# Patient Record
Sex: FEMALE | Race: WHITE | Employment: OTHER | URBAN - METROPOLITAN AREA
[De-identification: names, ages, dates, MRNs, and addresses within clinical notes are randomized per-mention and may not be internally consistent; named-entity substitution may affect disease eponyms.]

---

## 2019-04-03 ENCOUNTER — APPOINTMENT (OUTPATIENT)
Dept: CT IMAGING | Age: 74
End: 2019-04-03
Payer: MEDICARE

## 2019-04-03 ENCOUNTER — APPOINTMENT (OUTPATIENT)
Dept: GENERAL RADIOLOGY | Age: 74
End: 2019-04-03
Payer: MEDICARE

## 2019-04-03 ENCOUNTER — HOSPITAL ENCOUNTER (EMERGENCY)
Age: 74
Discharge: HOME | End: 2019-04-03
Payer: MEDICARE

## 2019-04-03 VITALS
TEMPERATURE: 98.6 F | DIASTOLIC BLOOD PRESSURE: 59 MMHG | BODY MASS INDEX: 44.16 KG/M2 | SYSTOLIC BLOOD PRESSURE: 135 MMHG | HEART RATE: 78 BPM | RESPIRATION RATE: 16 BRPM | HEIGHT: 62 IN | OXYGEN SATURATION: 95 % | WEIGHT: 240 LBS

## 2019-04-03 DIAGNOSIS — S01.81XA FACIAL LACERATION, INITIAL ENCOUNTER: ICD-10-CM

## 2019-04-03 DIAGNOSIS — S89.92XA INJURY OF LEFT KNEE, INITIAL ENCOUNTER: ICD-10-CM

## 2019-04-03 DIAGNOSIS — S16.1XXA STRAIN OF NECK MUSCLE, INITIAL ENCOUNTER: ICD-10-CM

## 2019-04-03 DIAGNOSIS — S09.90XA INJURY OF HEAD, INITIAL ENCOUNTER: Primary | ICD-10-CM

## 2019-04-03 PROCEDURE — 73560 X-RAY EXAM OF KNEE 1 OR 2: CPT

## 2019-04-03 PROCEDURE — 99284 EMERGENCY DEPT VISIT MOD MDM: CPT

## 2019-04-03 PROCEDURE — 2500000003 HC RX 250 WO HCPCS: Performed by: PHYSICIAN ASSISTANT

## 2019-04-03 PROCEDURE — 70450 CT HEAD/BRAIN W/O DYE: CPT

## 2019-04-03 PROCEDURE — 72125 CT NECK SPINE W/O DYE: CPT

## 2019-04-03 PROCEDURE — 4500000027

## 2019-04-03 PROCEDURE — 6370000000 HC RX 637 (ALT 250 FOR IP): Performed by: PHYSICIAN ASSISTANT

## 2019-04-03 RX ORDER — HYDROCODONE BITARTRATE AND ACETAMINOPHEN 5; 325 MG/1; MG/1
1 TABLET ORAL ONCE
Status: COMPLETED | OUTPATIENT
Start: 2019-04-03 | End: 2019-04-03

## 2019-04-03 RX ORDER — METHOCARBAMOL 500 MG/1
500 TABLET, FILM COATED ORAL 4 TIMES DAILY
Qty: 20 TABLET | Refills: 0 | Status: SHIPPED | OUTPATIENT
Start: 2019-04-03 | End: 2019-04-15

## 2019-04-03 RX ORDER — IBUPROFEN 600 MG/1
600 TABLET ORAL EVERY 6 HOURS PRN
Qty: 30 TABLET | Refills: 0 | Status: SHIPPED | OUTPATIENT
Start: 2019-04-03

## 2019-04-03 RX ORDER — LIDOCAINE HYDROCHLORIDE 20 MG/ML
5 INJECTION, SOLUTION INFILTRATION; PERINEURAL ONCE
Status: COMPLETED | OUTPATIENT
Start: 2019-04-03 | End: 2019-04-03

## 2019-04-03 RX ADMIN — HYDROCODONE BITARTRATE AND ACETAMINOPHEN 1 TABLET: 5; 325 TABLET ORAL at 14:44

## 2019-04-03 RX ADMIN — LIDOCAINE HYDROCHLORIDE 5 ML: 20 INJECTION, SOLUTION INFILTRATION; PERINEURAL at 14:43

## 2019-04-03 ASSESSMENT — PAIN SCALES - GENERAL
PAINLEVEL_OUTOF10: 7
PAINLEVEL_OUTOF10: 7
PAINLEVEL_OUTOF10: 5
PAINLEVEL_OUTOF10: 8

## 2019-04-03 ASSESSMENT — PAIN DESCRIPTION - INTENSITY: RATING_2: 8

## 2019-04-03 ASSESSMENT — PAIN DESCRIPTION - LOCATION
LOCATION: HEAD
LOCATION: HEAD;KNEE
LOCATION_2: KNEE

## 2019-04-03 ASSESSMENT — PAIN DESCRIPTION - ORIENTATION: ORIENTATION_2: LEFT

## 2019-04-03 ASSESSMENT — PAIN DESCRIPTION - PAIN TYPE
TYPE: ACUTE PAIN
TYPE: ACUTE PAIN

## 2019-04-03 ASSESSMENT — PAIN DESCRIPTION - DESCRIPTORS: DESCRIPTORS: ACHING

## 2019-04-03 NOTE — ED NOTES
Bed: H-01  Expected date: 4/3/19  Expected time:   Means of arrival: Ambulance  Comments:  Renetta Gill  04/03/19 [id number]

## 2019-04-03 NOTE — ED PROVIDER NOTES
eMERGENCY dEPARTMENT eNCOUnter        [address] [facility]    Chief Complaint   Patient presents with    Fall     pt states she tripped over a door stopper and fell into a metal picinic table; laceration to forehead and states she also landed on her left knee    Headache     reports headache after fall; denies visual changes; denies dizziness; denies LOC when she hit her head; pt denies being on blood thinners     Head Injury    Knee Pain     left knee pain secondary to fall       HPI     Tian Hsieh is a 68 y.o. female who presents with a head injury with an onset  approximately 1 hour prior to arrival.   The context (mechanism) was patient was walking outside a school building when she tripped over a door stopper, fell forward and hit her head on a metal picnic table. There was no associated loss of consciousness. The patient has associated neck pain. She has associated laceration to left side of forehead as well as left knee pain. REVIEW OF SYSTEMS    Constitutional:  Denies fever, chills  Neurologic:  See HPI. Denies LOC. Denies confusion or memory loss. Denies light-headedness, dizziness. See hpi  Eyes:  Denies dipplopia, blurred vision, or loss visual field. Denies discharge. Ears: no ear trauma or hearing changes  Musculoskeletal:  See HPI. No upper or lower extremity injuries. Cardiac: No Chest Pain, palpitations, or Chest Injury  Respiratory:  Denies cough, shortness of breath, respiratory discomfort   GI: No nausea or vomiting.   No Abdominal pain or Abdominal Injury  : No Dysuria or Hematuria   Skin:  + laceration      All other review of systems are negative  See HPI and nursing notes for additional information         PAST MEDICAL & SURGICAL HISTORY    Past Medical History:   Diagnosis Date    Acid reflux     Arthritis     Hemorrhoids     HX OTHER MEDICAL     Primary Care Physician Is Dr. Rakesh Fleming Hyperlipidemia 2004    Wears glasses      Past Surgical History: Procedure Laterality Date    BREAST BIOPSY Left 2000's    \"Calcium Deposits\", Benign    BREAST LUMPECTOMY Right 1970    Benign    CHOLECYSTECTOMY  2/14/14    laprascopic    COLONOSCOPY  2004, 7-29-14    KNEE ARTHROSCOPY Left 2012    KNEE SURGERY      TONSILLECTOMY  1950's    TUBAL LIGATION  1978       CURRENT MEDICATIONS    Current Outpatient Rx   Medication Sig Dispense Refill    ibuprofen (ADVIL;MOTRIN) 600 MG tablet Take 1 tablet by mouth every 6 hours as needed for Pain 30 tablet 0    methocarbamol (ROBAXIN) 500 MG tablet Take 1 tablet by mouth 4 times daily As needed for muscle spasm. 20 tablet 0    ondansetron (ZOFRAN ODT) 4 MG disintegrating tablet Take 2 tablets by mouth every 8 hours as needed for Nausea or Vomiting 12 tablet 0    loperamide (RA ANTI-DIARRHEAL) 2 MG capsule Take 1 capsule by mouth 4 times daily as needed for Diarrhea 20 capsule 0    simvastatin (ZOCOR) 10 MG tablet Take 10 mg by mouth nightly      esomeprazole Magnesium (NEXIUM) 20 MG PACK Take 20 mg by mouth daily      simvastatin (ZOCOR) 20 MG tablet Take 20 mg by mouth nightly.  Cholecalciferol (VITAMIN D) 2000 UNITS CAPS capsule Take  by mouth daily. Over The Counter      vitamin B-12 (CYANOCOBALAMIN) 1000 MCG tablet Place 1,000 mcg under the tongue daily. Over The Counter      simvastatin (ZOCOR) 20 MG tablet Take 1 tablet by mouth nightly.  90 tablet 1       ALLERGIES    No Known Allergies    SOCIAL & FAMILY HISTORY    Social History     Socioeconomic History    Marital status:      Spouse name: None    Number of children: None    Years of education: None    Highest education level: None   Occupational History    None   Social Needs    Financial resource strain: None    Food insecurity:     Worry: None     Inability: None    Transportation needs:     Medical: None     Non-medical: None   Tobacco Use    Smoking status: Never Smoker    Smokeless tobacco: Never Used   Substance and Sexual Activity  Alcohol use: No    Drug use: No    Sexual activity: Never   Lifestyle    Physical activity:     Days per week: None     Minutes per session: None    Stress: None   Relationships    Social connections:     Talks on phone: None     Gets together: None     Attends Yarsanism service: None     Active member of club or organization: None     Attends meetings of clubs or organizations: None     Relationship status: None    Intimate partner violence:     Fear of current or ex partner: None     Emotionally abused: None     Physically abused: None     Forced sexual activity: None   Other Topics Concern    None   Social History Narrative    None     Family History   Adopted: Yes       PHYSICAL EXAM    VITAL SIGNS: /67   Pulse 68   Temp 98.6 °F (37 °C)   Resp 16   Ht 5' 1.5\" (1.562 m)   Wt 240 lb (108.9 kg)   SpO2 99%   BMI 44.61 kg/m²    Constitutional:  Well developed, well nourished, no acute distress   Scalp:  No swelling, discoloration. Skin intact  Neck:   No JVD    No swelling or discoloration on inspection. No posterior midline neck tenderness. No pain or deficit on range of motion. Eyes: PERRL. EOMI. No nystagmus. Funduscopic exam reveals no obvious retinal hemorrhages, defects. HENT:   Laceration noted to left side of forehead  No trismus, airway patent. EACs and TMs clear. Nares patent bilaterally without clear fluid or blood. No septal mass or evidence of hematoma. Oropharynx clear, dentition intact   No Castillo sign,  no raccoon sign. Palpation of facial bones reveal no focal tenderness, palpable defect. Respiratory:  Lungs Clear, no retractions. No chest wall swelling, discoloration, tenderness  Cardiovascular:  Reg rate, no murmurs  GI:  Soft, nontender, normal bowel sounds  Musculoskeletal:  No edema, no acute deformities  Left knee without swelling, bruising or ecchymosis. There is discomfort palpation along the anterior patella and into lateral joint line.  Patient has decreased flexion. Full extension, able to raise heel off the bed. Sensation intact throughout left lower extremity. Posterior tibial pulse 2+, dorsalis pedis pulse 2+. No skin changes, tenderness or range of motion deficit of hip or ankle. Integument:  Well hydrated, no petechiae   Laceration noted to left side of forehead, approximately 2.2 cm in length, oozing blood. Wound edges are jagged. Slightly gaping. No evidence of foreign body or tendon involvement. Patient able to raise eyebrows. Neurologic:    - Alert & oriented person, place, time, and situation, no speech difficulties or slurring.  - No obvious gross motor deficits  - Cranial nerves 2-12 grossly intact  - Patient reports small area of altered sensation just lateral to left eye, otherwise sensation intact to light touch  - Strength 5/5 in upper and lower extremities bilaterally  - Normal finger to nose test bilaterally  - Rapid alternating movements intact  - Normal heel-shin bilaterally  - No pronator drift. - Light touch sensation intact throughout. - Upper and lower extremity DTRs 2+ bilaterally. - Gait steady and without difficulty    Psych: Pleasant affect, no hallucinations      IMAGING:  CT Cervical Spine WO Contrast   Final Result   No acute abnormality of the cervical spine. CT Head WO Contrast   Final Result   No acute intracranial abnormality. XR KNEE LEFT (1-2 VIEWS)   Final Result   1. No acute bony or joint abnormality   2. Tricompartmental osteoarthritic changes           ________________________________________________________________________       Procedure Note - Ramonita Allen PA-C      Laceration Repair Procedure Note    Indication: Skin Laceration    Procedure:   - Procedure explained, including risks and benefits explained to the patient who expressed understanding. All questions were answered. Verbal consent obtained.     - The Wound was prepped and draped in the usual sterile fashion using Betadine and sterile saline.  - The wound is anesthetized using 2% lidocaine, approximately 3 ml  - Wound was explored to it's depth,  no compromise of neurovascular structures, no foreign bodies. - Wound was irrigated with copious amounts of sterile saline and mechanically debrided utilizing sterile gauze. - The laceration was Closed with 5-0 Prolene sutures, total number of 6, simple interrupted  - Hemostasis and good cosmesis was achieved. Blood loss minimal.  - The wound area was then dressed with Sterile nonstick dressing, sterile gauze, and tape. - Patient tolerated procedure well without complications. Total repaired wound length: 3 cm    Discussed with Pt (and/or family member) at bedside today:  I discussed possibility of infection, retained foreign body, tendon injury, nerve injury. Wound care and scar minimization education was provided. Instructions were given to return for increasing pain, redness, streaking, discharge, or any other worsening or worrisome concerns. Wound check in 48 hours. Suture/Staple removal in 5 days. ________________________________________________________________________          ED COURSE & MEDICAL DECISION MAKING       Vital signs and nursing notes reviewed during ED course. I have independently evaluated this patient . Supervising MD present in the Emergency Department, available for consultation, throughout entirety of  patient care. Patient presents above following a fall. On arrival, patient is hemodynamically stable, afebrile. She is neurologically intact. Left forehead closed as above. Proper wound care discussed with patient she is to have wound recheck the next several days and have sutures removed in 5 days. Imaging of head, neck and knee without acute abnormality. Knee is Ace wrap in the ED, patient provided with crutches.  She understands that there may be internal derangement of knee and that she is follow-up with orthopedics next week for further evaluation. Patient to return here with any acutely worsening symptoms including onset of any neurological symptoms. The patient and/or the family were informed of the results of any tests/labs/imaging, the treatment plan, and time was allotted to answer questions. Clinical  IMPRESSION    1. Injury of head, initial encounter    2. Facial laceration, initial encounter    3. Injury of left knee, initial encounter    4. Strain of neck muscle, initial encounter          Diagnosis and plan discussed in detail with patient who understands and agrees. Return to emergency Department precautions, which included any change in nature or severity of symptoms, development of numbness/tingling, or weakness, or any new symptoms, were discussed in detail with patient who understands and agrees. Comment: Please note this report has been produced using speech recognition software and may contain errors related to that system including errors in grammar, punctuation, and spelling, as well as words and phrases that may be inappropriate. If there are any questions or concerns please feel free to contact the dictating provider for clarification.               TREE Kenny  04/03/19 1080

## 2019-04-15 ENCOUNTER — OFFICE VISIT (OUTPATIENT)
Dept: ORTHOPEDIC SURGERY | Age: 74
End: 2019-04-15
Payer: MEDICARE

## 2019-04-15 VITALS
BODY MASS INDEX: 45.17 KG/M2 | OXYGEN SATURATION: 99 % | HEART RATE: 75 BPM | RESPIRATION RATE: 14 BRPM | WEIGHT: 243 LBS

## 2019-04-15 DIAGNOSIS — R52 PAIN: ICD-10-CM

## 2019-04-15 DIAGNOSIS — M17.12 ARTHRITIS OF KNEE, LEFT: Primary | ICD-10-CM

## 2019-04-15 PROBLEM — E78.5 HYPERLIPIDEMIA: Status: ACTIVE | Noted: 2017-10-20

## 2019-04-15 PROBLEM — S06.0X0A CONCUSSION WITHOUT LOSS OF CONSCIOUSNESS: Status: ACTIVE | Noted: 2019-04-11

## 2019-04-15 PROCEDURE — 99202 OFFICE O/P NEW SF 15 MIN: CPT | Performed by: PHYSICIAN ASSISTANT

## 2019-04-15 RX ORDER — CALCIUM CARBONATE 200(500)MG
TABLET,CHEWABLE ORAL
COMMUNITY
Start: 2017-04-06

## 2019-04-15 RX ORDER — SIMVASTATIN 20 MG
20 TABLET ORAL
COMMUNITY

## 2019-04-15 RX ORDER — METHOCARBAMOL 500 MG/1
500 TABLET, FILM COATED ORAL
COMMUNITY
Start: 2019-04-03 | End: 2021-06-15

## 2019-04-15 ASSESSMENT — ENCOUNTER SYMPTOMS
GASTROINTESTINAL NEGATIVE: 1
EYES NEGATIVE: 1
RESPIRATORY NEGATIVE: 1

## 2019-04-15 NOTE — PROGRESS NOTES
Patient is a 68year old female that presents in office with c/o left knee pain. Patient stated that she fell about a week and a half ago. Previous left knee meniscus repair performed in 2012  In [place]. No other conservative treatments, injuries, or surgeries reported. Associated sx: swelling and bruising. Pain is located along the medial and anterior aspect of the knee.

## 2019-04-15 NOTE — PROGRESS NOTES
Scribe Authentication Statement  Paul Noland, scribed portions of this documentation for and in the presence of Manuel Flores PA-C on 4/15/19 at 10:46 AM.    Provider Authentication Statement:  I, Sherron Mcbride PA-C, personally performed the services described in this documentation and they were scribed in my presence by the above listed scribe. The documentation is both accurate and complete. Review of Systems   Constitutional: Positive for fatigue. HENT: Negative. Eyes: Negative. Respiratory: Negative. Cardiovascular: Negative. Gastrointestinal: Negative. Genitourinary: Negative. Musculoskeletal: Positive for arthralgias, joint swelling and myalgias. Skin: Negative. Neurological: Negative. Psychiatric/Behavioral: Negative. HPI:  Cordell Bob is a 68y.o. year old female who complains of left knee pain. Patient stated that she fell about a week and a half ago. Her pain initially was about a 7/10, aching in the anterior and medial aspect of the knee but now the pain is improving. Previous left knee meniscectomy performed in 2012 in [place]. No other conservative treatments, injuries, or surgeries reported. Associated sx: swelling and bruising. She has been taking Tylenol with mild improvement of her pain. She was given crutches but she states that she was so sore she cannot use her crutches so she has been bearing weight as tolerated. Referred by ED for left knee pain.      Past Medical History:   Diagnosis Date    Acid reflux     Arthritis     Hemorrhoids     HX OTHER MEDICAL     Primary Care Physician Is Dr. Pryor Door Hyperlipidemia 2004    Wears glasses        Past Surgical History:   Procedure Laterality Date    BREAST BIOPSY Left 2000's    \"Calcium Deposits\", Benign    BREAST LUMPECTOMY Right 1970    Benign    CHOLECYSTECTOMY  2/14/14    laprascopic    COLONOSCOPY  2004, 7-29-14    KNEE ARTHROSCOPY Left 2012    KNEE SURGERY      TONSILLECTOMY  1950's    TUBAL LIGATION  1978       Family History   Adopted: Yes       Social History     Socioeconomic History    Marital status:      Spouse name: None    Number of children: None    Years of education: None    Highest education level: None   Occupational History    None   Social Needs    Financial resource strain: None    Food insecurity:     Worry: None     Inability: None    Transportation needs:     Medical: None     Non-medical: None   Tobacco Use    Smoking status: Never Smoker    Smokeless tobacco: Never Used   Substance and Sexual Activity    Alcohol use: No    Drug use: No    Sexual activity: Never   Lifestyle    Physical activity:     Days per week: None     Minutes per session: None    Stress: None   Relationships    Social connections:     Talks on phone: None     Gets together: None     Attends Moravian service: None     Active member of club or organization: None     Attends meetings of clubs or organizations: None     Relationship status: None    Intimate partner violence:     Fear of current or ex partner: None     Emotionally abused: None     Physically abused: None     Forced sexual activity: None   Other Topics Concern    None   Social History Narrative    None       Current Outpatient Medications   Medication Sig Dispense Refill    simvastatin (ZOCOR) 20 MG tablet Take 20 mg by mouth      Mirabegron ER (MYRBETRIQ) 50 MG TB24 Take 50 mg by mouth      methocarbamol (ROBAXIN) 500 MG tablet Take 500 mg by mouth      calcium carbonate (TUMS) 500 MG chewable tablet Take one tablet daily by mouth CALCIUM/HEARTBURN      ibuprofen (ADVIL;MOTRIN) 600 MG tablet Take 1 tablet by mouth every 6 hours as needed for Pain 30 tablet 0    esomeprazole Magnesium (NEXIUM) 20 MG PACK Take 20 mg by mouth daily      Cholecalciferol (VITAMIN D) 2000 UNITS CAPS capsule Take  by mouth daily.  Over The Counter      vitamin B-12 (CYANOCOBALAMIN) 1000 MCG tablet Place 1,000 mcg under the tongue daily. Over The Counter       No current facility-administered medications for this visit. No Known Allergies    Review of Systems:  See above      Physical Exam:   Pulse 75   Resp 14   Wt 243 lb (110.2 kg)   SpO2 99%   BMI 45.17 kg/m²        Gait is trace abnormal. The patient can bear weight on the injured extremity. Gen/Psych: Examinationreveals a pleasant individual in no acute distress. The patient is oriented to time, place and person. The patient's mood and affect are appropriate.     Lymph: The lymphatic examination bilaterally reveals allareas to be without enlargement or induration.      Skin intact without lymphadenopathy, discoloration, or abnormal temperature.      Vascular: There is intact, symmetric circulation in both lowerextremities. left leg/knee exam:  Leg alignment:     neutral  Quadriceps/hamstring atrophy:   no  Knee effusion:    mild   Knee erythema:   No but there is mild ecchymosis. ROM:     0-115°  Varus laxity at 0 and 30 deg's: no  Valguslaxity at 0 and 30 deg's: no  Recurvatum:    no  Tenderness at:   Medial joint line and anterior knee  Knee strength is 5/5 flexion and extension  There is + L2-S1 motor and sensory function. Outside record review: ER records and historical medical records    Imaging:  left knee x-rays, three views,were obtained and reviewed and show no fractures, no dislocations. Moderate DJD of the left knee mostly in the medial compartment with severe joint space narrowing and osteophyte formation. The official read and interpretation of these x-rays will be done by the the [facility]         Impression:  left knee DJD      Plan:  Natural history and expected course discussed. Questions answered.   Take OTC medication as needed  Activity as tolerated   Call office with persistent or worsening pain

## 2019-04-15 NOTE — PATIENT INSTRUCTIONS
Take OTC medication as needed  Activity as tolerated   Call office with persistent or worsening pain

## 2020-03-25 PROBLEM — K21.9 GERD (GASTROESOPHAGEAL REFLUX DISEASE): Status: RESOLVED | Noted: 2020-03-25 | Resolved: 2020-03-24

## 2020-03-25 PROBLEM — E78.5 HYPERLIPIDEMIA: Status: RESOLVED | Noted: 2020-03-25 | Resolved: 2020-03-24

## 2021-06-20 ENCOUNTER — APPOINTMENT (OUTPATIENT)
Dept: CT IMAGING | Age: 76
End: 2021-06-20
Payer: MEDICARE

## 2021-06-20 ENCOUNTER — HOSPITAL ENCOUNTER (EMERGENCY)
Age: 76
Discharge: HOME | End: 2021-06-20
Payer: MEDICARE

## 2021-06-20 VITALS
DIASTOLIC BLOOD PRESSURE: 46 MMHG | OXYGEN SATURATION: 98 % | SYSTOLIC BLOOD PRESSURE: 134 MMHG | RESPIRATION RATE: 18 BRPM | HEART RATE: 69 BPM | TEMPERATURE: 98.5 F

## 2021-06-20 DIAGNOSIS — S00.83XA CONTUSION OF FACE, INITIAL ENCOUNTER: ICD-10-CM

## 2021-06-20 DIAGNOSIS — S09.90XA CLOSED HEAD INJURY, INITIAL ENCOUNTER: ICD-10-CM
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
## 2021-06-20 PROCEDURE — 70450 CT HEAD/BRAIN W/O DYE: CPT

## 2021-06-20 PROCEDURE — 99285 EMERGENCY DEPT VISIT HI MDM: CPT

## 2021-06-20 PROCEDURE — 70486 CT MAXILLOFACIAL W/O DYE: CPT

## 2021-06-20 PROCEDURE — 90471 IMMUNIZATION ADMIN: CPT | Performed by: PHYSICIAN ASSISTANT

## 2021-06-20 PROCEDURE — 6360000002 HC RX W HCPCS: Performed by: PHYSICIAN ASSISTANT

## 2021-06-20 PROCEDURE — 6370000000 HC RX 637 (ALT 250 FOR IP): Performed by: PHYSICIAN ASSISTANT

## 2021-06-20 PROCEDURE — 72125 CT NECK SPINE W/O DYE: CPT

## 2021-06-20 PROCEDURE — 90715 TDAP VACCINE 7 YRS/> IM: CPT | Performed by: PHYSICIAN ASSISTANT

## 2021-06-20 RX ORDER — ACETAMINOPHEN 500 MG
1000 TABLET ORAL ONCE
Status: COMPLETED | OUTPATIENT
Start: 2021-06-20 | End: 2021-06-20

## 2021-06-20 RX ADMIN — ACETAMINOPHEN 1000 MG: 500 TABLET, FILM COATED ORAL at 16:33

## 2021-06-20 RX ADMIN — TETANUS TOXOID, REDUCED DIPHTHERIA TOXOID AND ACELLULAR PERTUSSIS VACCINE, ADSORBED 0.5 ML: 5; 2.5; 8; 8; 2.5 SUSPENSION INTRAMUSCULAR at 16:34

## 2021-06-20 ASSESSMENT — PAIN SCALES - GENERAL
PAINLEVEL_OUTOF10: 8
PAINLEVEL_OUTOF10: 8

## 2021-06-28 NOTE — ED PROVIDER NOTES
EMERGENCY DEPARTMENT ENCOUNTER      PCP: Red Robertson, [facility]    Chief Complaint   Patient presents with    Fall     hit head when she fell walking down hallway, no LOC, no blood thinners     This patient was not evaluated by the attending physician. I have independently evaluated this patient. BRAD Lama is a 77 y.o. female who presents to the emergency department today via private vehicle after sustaining a mechanical fall. Patient states that she tripped forward causing her to hit her face, has some left-sided facial pain, evidence of a scalp hematoma. She denies any anticoagulated. Denies losing consciousness, did the helped to her feet. Describing no other injuries. Is alert oriented, no signs of neurologic abnormalities. Is conversational.  States that she usually gets around well. Denies any preceding symptoms    REVIEW OF SYSTEMS    General: No Fever  ENT:  No visual changes. Mild headache. Cardiac: No Chest Pain, No syncope  Respiratory: No cough or difficulty breathing  GI: No vomiting. No Bloody Stool or Diarrhea  : No Dysuria or Hematuria  MSKTL: No extremity pain. no neck or back pain.   Skin:  Denies rash  Neurologic:  Denies headache, focal weakness or sensory changes   Endocrine:  Denies polyuria or polydypsia   Lymphatic:  Denies swollen glands   See HPI and nursing notes for additional information     PAST MEDICAL & SURGICAL HISTORY    Past Medical History:   Diagnosis Date    Acid reflux     Arthritis     Hemorrhoids     HX OTHER MEDICAL     Primary Care Physician Is Dr. Emma Deal Hyperlipidemia 2004    Wears glasses      Past Surgical History:   Procedure Laterality Date    BREAST BIOPSY Left 2000's    \"Calcium Deposits\", Benign    BREAST LUMPECTOMY Right 1970    Benign    CHOLECYSTECTOMY  2/14/14    laprascopic    COLONOSCOPY  2004, 7-29-14    KNEE ARTHROSCOPY Left 2012    KNEE SURGERY      TONSILLECTOMY  1950's    TUBAL LIGATION 1978       CURRENT MEDICATIONS    Current Outpatient Rx   Medication Sig Dispense Refill    lidocaine-prilocaine (EMLA) 2.5-2.5 % cream Apply topically as needed. 1 Tube 0    simvastatin (ZOCOR) 20 MG tablet Take 20 mg by mouth      calcium carbonate (TUMS) 500 MG chewable tablet Take one tablet daily by mouth CALCIUM/HEARTBURN      ibuprofen (ADVIL;MOTRIN) 600 MG tablet Take 1 tablet by mouth every 6 hours as needed for Pain 30 tablet 0    esomeprazole Magnesium (NEXIUM) 20 MG PACK Take 20 mg by mouth daily      Cholecalciferol (VITAMIN D) 2000 UNITS CAPS capsule Take  by mouth daily. Over The Counter      vitamin B-12 (CYANOCOBALAMIN) 1000 MCG tablet Place 1,000 mcg under the tongue daily. Over The Counter         ALLERGIES    No Known Allergies    SOCIAL & FAMILY HISTORY    Social History     Socioeconomic History    Marital status:      Spouse name: None    Number of children: None    Years of education: None    Highest education level: None   Occupational History    None   Tobacco Use    Smoking status: Never Smoker    Smokeless tobacco: Never Used   Vaping Use    Vaping Use: Never used   Substance and Sexual Activity    Alcohol use: No    Drug use: No    Sexual activity: Never   Other Topics Concern    None   Social History Narrative    None     Social Determinants of Health     Financial Resource Strain:     Difficulty of Paying Living Expenses:    Food Insecurity:     Worried About Running Out of Food in the Last Year:     Ran Out of Food in the Last Year:    Transportation Needs:     Lack of Transportation (Medical):      Lack of Transportation (Non-Medical):    Physical Activity:     Days of Exercise per Week:     Minutes of Exercise per Session:    Stress:     Feeling of Stress :    Social Connections:     Frequency of Communication with Friends and Family:     Frequency of Social Gatherings with Friends and Family:     Attends Temple Services:     Active Member of Clubs or Organizations:     Attends Club or Organization Meetings:     Marital Status:    Intimate Partner Violence:     Fear of Current or Ex-Partner:     Emotionally Abused:     Physically Abused:     Sexually Abused:      Family History   Adopted: Yes     PHYSICAL EXAM    VITAL SIGNS: BP (!) 134/46   Pulse 69   Temp 98.5 °F (36.9 °C)   Resp 18   SpO2 98%    Constitutional:  Well developed, well nourished, no acute distress   Eyes: EOMI. PERRL, sclera nonicteric. HENT: Left frontal scalp hematoma present, no trismus. Ears canals and TMs free of blood or clear fluid. Nasal passages and oropharynx free of blood or clear fluid. No circumferential periorbital ecchymosis or mastoid ecchymosis noted. Neck/Lymphatics: supple, no JVD, no swollen nodes. No posterior neck tenderness. Range of motion without obvious pain or deficit. Respiratory:  Lungs Clear, no retractions   Cardiovascular:   normal rate, no murmurs  GI:  Soft, nontender, normal bowel sounds  Musculoskeletal:  No edema, no obvious palpable bony abnormalities, neurovascularly intact. Integument:  Well hydrated, no petechiae   Neurologic:    - Alert & oriented person, place, time, and situation, no speech difficulties or slurring.  - No obvious gross motor deficits  - Cranial nerves 2-12 grossly intact  - Negative meningeal signs.  - Sensation intact to light touch  - Strength 5/5 in upper and lower extremities bilaterally. - Light touch sensation intact throughout. - Upper and lower extremity DTRs 2+ bilaterally.   - Gait steady and without difficulty  Psych: Pleasant affect, no hallucinations    RADIOLOGY   CT HEAD WO CONTRAST    Result Date: 6/20/2021  EXAMINATION: CT OF THE HEAD WITHOUT CONTRAST  6/20/2021 3:54 pm TECHNIQUE: CT of the head was performed without the administration of intravenous contrast. Dose modulation, iterative reconstruction, and/or weight based adjustment of the mA/kV was utilized to reduce the radiation dose to as low as reasonably achievable. COMPARISON: 04/03/2019 HISTORY: ORDERING SYSTEM PROVIDED HISTORY: fall TECHNOLOGIST PROVIDED HISTORY: Reason for exam:->fall Has a \"code stroke\" or \"stroke alert\" been called? ->No Decision Support Exception - unselect if not a suspected or confirmed emergency medical condition->Emergency Medical Condition (MA) Reason for Exam: fall FINDINGS: BRAIN/VENTRICLES: There is no acute intracranial hemorrhage, mass effect or midline shift. No abnormal extra-axial fluid collection. The gray-white differentiation is maintained without evidence of an acute infarct. There is no evidence of hydrocephalus. ORBITS: The visualized portion of the orbits demonstrate no acute abnormality. SINUSES: The visualized paranasal sinuses and mastoid air cells demonstrate no acute abnormality. SOFT TISSUES/SKULL:  Left frontal scalp swelling. No underlying fracture or radiopaque foreign body. 1. No acute intracranial abnormality. 2. Left frontal scalp contusion. No underlying fracture. CT FACIAL BONES WO CONTRAST    Result Date: 6/20/2021  EXAMINATION: CT OF THE FACE WITHOUT CONTRAST  6/20/2021 3:55 pm TECHNIQUE: CT of the face was performed without the administration of intravenous contrast. Multiplanar reformatted images are provided for review. Dose modulation, iterative reconstruction, and/or weight based adjustment of the mA/kV was utilized to reduce the radiation dose to as low as reasonably achievable. COMPARISON: None HISTORY: ORDERING SYSTEM PROVIDED HISTORY: fall. left sided facial pain TECHNOLOGIST PROVIDED HISTORY: Reason for exam:->fall. left sided facial pain Decision Support Exception - unselect if not a suspected or confirmed emergency medical condition->Emergency Medical Condition (MA) Reason for Exam: fall. left sided facial pain FINDINGS: FACIAL BONES:  The maxilla, pterygoid plates and zygomatic arches are intact. The mandible is intact.   The mandibular condyles are normally situated. The nasal bones and maxillary nasal processes are intact. ORBITS:  The globes appear intact. The extraocular muscles, optic nerve sheath complexes and lacrimal glands appear unremarkable. No retrobulbar hematoma or mass is seen. The orbital walls and rims are intact. SINUSES/MASTOIDS:  The paranasal sinuses and mastoid air cells are well aerated. No acute fracture is seen. SOFT TISSUES:  No appreciable facial soft tissue swelling is seen. No acute traumatic injury of the facial bones. CT CERVICAL SPINE WO CONTRAST    Result Date: 6/20/2021  EXAMINATION: CT OF THE CERVICAL SPINE WITHOUT CONTRAST 6/20/2021 3:55 pm TECHNIQUE: CT of the cervical spine was performed without the administration of intravenous contrast. Multiplanar reformatted images are provided for review. Dose modulation, iterative reconstruction, and/or weight based adjustment of the mA/kV was utilized to reduce the radiation dose to as low as reasonably achievable. COMPARISON: None. HISTORY: ORDERING SYSTEM PROVIDED HISTORY: fall TECHNOLOGIST PROVIDED HISTORY: Reason for exam:->fall Decision Support Exception - unselect if not a suspected or confirmed emergency medical condition->Emergency Medical Condition (MA) Reason for Exam: fall FINDINGS: BONES/ALIGNMENT: There is reversal of the normal cervical lordosis. This is unchanged from prior examination. No listhesis. Vertebral body heights are maintained. No displaced fracture. DEGENERATIVE CHANGES: Multilevel degenerative changes of the cervical spine. SOFT TISSUES: There is no prevertebral soft tissue swelling. Subcentimeter right thyroid nodule with calcification. No acute abnormality of the cervical spine. RECOMMENDATIONS: Subcentimeter incidental right thyroid nodule. No follow-up imaging is recommended. Reference: J Am Johnathan Radiol. 2015 Feb;12(2): 143-50       ED COURSE & MEDICAL DECISION MAKING      Patient presents as above.   Emerge etiologies considered. Patient presenting after mechanical fall falling forward has a left scalp hematoma. Otherwise appears well, denies loss of consciousness, no preceding symptoms. Is not anticoagulated. No significant neck pain, the majority of pain is secondary to the left frontal scalp hematoma into the the nasal bone area. No signs of a septal nasal hematoma. Oropharyngeal exam within normal limits. No other tenderness to the bilateral upper and lower extremities. CT imaging obtained that was acutely negative for any acute intracranial abnormality, no underlying facial fractures. No cervical spine trauma otherwise remained at her baseline. At this point will advise conservative management, fall counseling, following up with primary care will be discharged home in stable condition    Return to emergency Department precautions were discussed in detail with patient and include worsening pain, new symptoms. All pertinent Lab data and radiographic results reviewed with patient at bedside. The patient and/or the family were informed of the results of any tests/labs/imaging, the treatment plan, and time was allotted to answer questions. Clinical  IMPRESSION    1. Fall, initial encounter    2. Closed head injury, initial encounter    3. Contusion of face, initial encounter      Comment: Please note this report has been produced using speech recognition software and may contain errors related to that system including errors in grammar, punctuation, and spelling, as well as words and phrases that may be inappropriate. If there are any questions or concerns please feel free to contact the dictating provider for clarification.       Anabell Lux, PA  06/28/21 [id number]